# Patient Record
Sex: FEMALE | Race: WHITE | NOT HISPANIC OR LATINO | URBAN - METROPOLITAN AREA
[De-identification: names, ages, dates, MRNs, and addresses within clinical notes are randomized per-mention and may not be internally consistent; named-entity substitution may affect disease eponyms.]

---

## 2023-03-31 LAB
CHORIOGONADOTROPIN (MIU/ML) IN SER/PLAS: <2 MIU/ML
ESTRADIOL (PG/ML) IN SER/PLAS: 45 PG/ML
PROGESTERONE (NG/ML) IN SER/PLAS: 0.7 NG/ML

## 2023-04-05 LAB
ESTRADIOL (PG/ML) IN SER/PLAS: 167 PG/ML
LUTEINIZING HORMONE (IU/ML) IN SER/PLAS: 0.7 IU/L
PROGESTERONE (NG/ML) IN SER/PLAS: <0.3 NG/ML

## 2023-05-01 LAB
CHORIOGONADOTROPIN (MIU/ML) IN SER/PLAS: <3 IU/L
ESTRADIOL (PG/ML) IN SER/PLAS: 19 PG/ML
PROGESTERONE (NG/ML) IN SER/PLAS: <0.3 NG/ML

## 2023-05-05 LAB
ESTRADIOL (PG/ML) IN SER/PLAS: 187 PG/ML
LUTEINIZING HORMONE (IU/ML) IN SER/PLAS: 0.7 IU/L
PROGESTERONE (NG/ML) IN SER/PLAS: <0.3 NG/ML

## 2023-09-06 LAB — ESTRADIOL (PG/ML) IN SER/PLAS: 37 PG/ML

## 2023-09-13 LAB
ESTRADIOL (PG/ML) IN SER/PLAS: 202 PG/ML
LUTEINIZING HORMONE (IU/ML) IN SER/PLAS: 5.8 IU/L
PROGESTERONE (NG/ML) IN SER/PLAS: 0.4 NG/ML

## 2023-09-14 LAB
ESTRADIOL (PG/ML) IN SER/PLAS: 1940 PG/ML
LUTEINIZING HORMONE (IU/ML) IN SER/PLAS: 1.8 IU/L
PROGESTERONE (NG/ML) IN SER/PLAS: <0.3 NG/ML

## 2023-09-16 LAB — PROGESTERONE (NG/ML) IN SER/PLAS: 18.4 NG/ML

## 2023-09-22 LAB — ESTRADIOL (PG/ML) IN SER/PLAS: 1812 PG/ML

## 2023-09-28 LAB
CHORIOGONADOTROPIN (MIU/ML) IN SER/PLAS: 325 IU/L
ESTRADIOL (PG/ML) IN SER/PLAS: 1849 PG/ML
PROGESTERONE (NG/ML) IN SER/PLAS: 15 NG/ML

## 2023-10-01 ENCOUNTER — LAB REQUISITION (OUTPATIENT)
Dept: LAB | Facility: HOSPITAL | Age: 38
End: 2023-10-01
Payer: COMMERCIAL

## 2023-10-01 DIAGNOSIS — O09.811 SUPERVISION OF PREGNANCY RESULTING FROM ASSISTED REPRODUCTIVE TECHNOLOGY, FIRST TRIMESTER (HHS-HCC): ICD-10-CM

## 2023-10-01 LAB
B-HCG SERPL-ACNC: 837 MIU/ML
TSH SERPL-ACNC: 1.92 MIU/L (ref 0.44–3.98)

## 2023-10-01 PROCEDURE — 84443 ASSAY THYROID STIM HORMONE: CPT

## 2023-10-01 PROCEDURE — 84702 CHORIONIC GONADOTROPIN TEST: CPT

## 2023-10-16 ENCOUNTER — LAB REQUISITION (OUTPATIENT)
Dept: LAB | Facility: HOSPITAL | Age: 38
End: 2023-10-16
Payer: COMMERCIAL

## 2023-10-16 DIAGNOSIS — O09.811 SUPERVISION OF PREGNANCY RESULTING FROM ASSISTED REPRODUCTIVE TECHNOLOGY, FIRST TRIMESTER (HHS-HCC): ICD-10-CM

## 2023-10-16 LAB
B-HCG SERPL-ACNC: ABNORMAL MIU/ML
ESTRADIOL SERPL-MCNC: 2239 PG/ML
PROGEST SERPL-MCNC: 17.8 NG/ML

## 2023-10-16 PROCEDURE — 82670 ASSAY OF TOTAL ESTRADIOL: CPT

## 2023-10-16 PROCEDURE — 84144 ASSAY OF PROGESTERONE: CPT

## 2023-10-16 PROCEDURE — 84702 CHORIONIC GONADOTROPIN TEST: CPT

## 2023-10-30 ENCOUNTER — LAB REQUISITION (OUTPATIENT)
Dept: LAB | Facility: HOSPITAL | Age: 38
End: 2023-10-30
Payer: COMMERCIAL

## 2023-10-30 DIAGNOSIS — O09.811 SUPERVISION OF PREGNANCY RESULTING FROM ASSISTED REPRODUCTIVE TECHNOLOGY, FIRST TRIMESTER (HHS-HCC): ICD-10-CM

## 2023-10-30 LAB
ESTRADIOL SERPL-MCNC: 1997 PG/ML
HOLD SPECIMEN: NORMAL
PROGEST SERPL-MCNC: 30.6 NG/ML

## 2023-10-30 PROCEDURE — 84144 ASSAY OF PROGESTERONE: CPT

## 2023-10-30 PROCEDURE — 82670 ASSAY OF TOTAL ESTRADIOL: CPT

## 2023-11-06 ENCOUNTER — LAB REQUISITION (OUTPATIENT)
Dept: LAB | Facility: HOSPITAL | Age: 38
End: 2023-11-06
Payer: COMMERCIAL

## 2023-11-06 DIAGNOSIS — O09.811 SUPERVISION OF PREGNANCY RESULTING FROM ASSISTED REPRODUCTIVE TECHNOLOGY, FIRST TRIMESTER (HHS-HCC): ICD-10-CM

## 2023-11-06 LAB
B-HCG SERPL-ACNC: ABNORMAL MIU/ML
ESTRADIOL SERPL-MCNC: 1449 PG/ML
PROGEST SERPL-MCNC: 50.5 NG/ML

## 2023-11-06 PROCEDURE — 84702 CHORIONIC GONADOTROPIN TEST: CPT

## 2023-11-06 PROCEDURE — 84144 ASSAY OF PROGESTERONE: CPT

## 2023-11-06 PROCEDURE — 82670 ASSAY OF TOTAL ESTRADIOL: CPT

## 2024-12-02 DIAGNOSIS — F98.8 ATTENTION DEFICIT DISORDER, UNSPECIFIED TYPE: Primary | ICD-10-CM

## 2024-12-24 ENCOUNTER — APPOINTMENT (OUTPATIENT)
Dept: BEHAVIORAL HEALTH | Facility: CLINIC | Age: 39
End: 2024-12-24
Payer: COMMERCIAL

## 2024-12-24 DIAGNOSIS — F98.8 ATTENTION DEFICIT DISORDER, UNSPECIFIED TYPE: ICD-10-CM

## 2024-12-24 PROCEDURE — 90792 PSYCH DIAG EVAL W/MED SRVCS: CPT | Performed by: STUDENT IN AN ORGANIZED HEALTH CARE EDUCATION/TRAINING PROGRAM

## 2024-12-24 NOTE — PROGRESS NOTES
"Outpatient Psychiatry    Subjective   Mary Lantigua, a 39 y.o. female, presenting to Psychiatry for evaluation.  Patient is referred by PHILL Eagle-CNP .      Virtual or Telephone Consent    An interactive audio and video telecommunication system which permits real time communications between the patient (at the originating site) and provider (at the distant site) was utilized to provide this telehealth service.   Verbal consent was requested and obtained from Mary Lantigua on this date, 25 for a telehealth visit.      HPI:    Patient reports that recently has noted she has been having difficult concentrating.  Is 6 months post partum. Looking back she feels like she has had these sx beofre, but now that she has a , it has become much harder for her to navigate daily life.   Was driving to [place] and forgot where she was going twice  Went to college at 31, was having difficulties paying attention in class, her teachers would tell her she is not paying attention to details, though she felt she was trying her best.   Similar sx in high school, but never addressed it.  Denies any depression sx in the past or present  Anxiety-largely centered around current inattention and increasing difficulties with daily life.   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   Is additionally having difficulties with charting after seeing patient.   Sleep- on and  off based on daughters sleep  Appetite- \"non existent\" since post pregnancy  No feeling overwhelmed with new born, reports she has good support system to help out with her  Acute stressors- feels like she \"looks stupid\" with her ongoing sx of inattention, forgetfulness. Feels sx escalated in February. Denies any current anxiety or depressive sx. Denies any past or present SI/HI/AVH.       Psychiatric Review Of Systems:  Depressive Symptoms: energy  Manic Symptoms: negative  Anxiety " Symptoms: General Anxiety Disorder (KIMBERELE)KIMBERLEE Behaviors: difficult to control worry, excessive anxiety/worry, difficulty concentrating, irritability, and restlessness  Psychotic Symptoms: negative  Other Symptoms:    Current Medications:    Current Outpatient Medications:     ascorbic acid (Vitamin C) 500 mg tablet, Take 1 tablet (500 mg) by mouth once daily., Disp: , Rfl:     cholecalciferol (Vitamin D3) 25 MCG (1000 UT) tablet, Take 1 tablet (1,000 Units) by mouth once daily., Disp: , Rfl:     ferrous sulfate 325 (65 Fe) MG EC tablet, Take 1 tablet by mouth once daily with breakfast. Do not crush, chew, or split., Disp: , Rfl:     Medical History:  No past medical history on file.    Past Psychiatric History:   Diagnoses: none  Previous Psychiatrist: none  Therapy: guidance counsellor when sister passed  Current psychiatric medications: none  Past psychiatric medications: none  Past psychiatric treatments: none  Hospitalizations:none  Suicide attempts:  none  Family psychiatric history: none    Social History:   Currently lives: , daughter  Education: college  Work/Finances: works as a Nutritionist at the [employer]  Marital history/children:   Current stressors: current inattention, and inability to complete tasks  Social support:    Legal History: at 20 for underage drinking   History: denies  History of violence: denies  Access to Weapons: yes,  is a       Substance Use History:  Tobacco use: none  Use of alcohol: minimal use  Use of caffeine: coffee 1 cup /day  Use of other substances: none  Legal consequences of substance use: denies  Substance use disorder treatment: none    Record Review: moderate     Medical Review Of Systems:  Pertinent items are noted in HPI.    OARRS:  Jeannette Lopez MD on 1/21/2025  3:29 PM  I have personally reviewed the OARRS report for Mary Rubalcavamarie. I have considered the risks of abuse, dependence, addiction and diversion        Objective  "  Mental Status Exam  Appearance: well groomed, appears stated age  Attitude: Calm, cooperative, and engaged in conversation.  Behavior: Appropriate eye contact.   Motor Activity: No psychomotor agitation or retardation. No abnormal movements, tremors or tics. No evidence of extrapyramidal symptoms or tardive dyskinesia.  Speech: Regular rate, rhythm, volume. Spontaneous, no pressured speech.  Mood: \"good\"  Affect: Euthymic, full range, mood congruent.  Thought Process: Linear, logical, and goal-directed. No loose associations or gross thought disorganization.  Thought Content: Denied current suicidal ideation or thoughts of harm to self, denied homicidal ideation or thoughts of harm to others. No delusional thinking elicited. No perseverations or obsessions identified.   Perception: Did not endorse auditory or visual hallucinations, did not appear to be responding to hallucinatory stimuli.   Cognition: Alert, oriented x3. Preserved attention span and concentration, recent and remote memory. Adequate fund of knowledge. No deficits in language.   Insight: Fair, in regards to understanding mental health condition  Judgement: Fair      Vitals:  There were no vitals filed for this visit.    Other Objective Information:  No visits with results within 6 Month(s) from this visit.   Latest known visit with results is:   Lab Requisition on 11/06/2023   Component Date Value Ref Range Status    Estradiol 11/06/2023 1,449  pg/mL Final    Progesterone 11/06/2023 50.5  ng/mL Final    Ref Values  Male              <0.3- 1.2    Follicular Phase  <0.3- 1.4       Luteal Phase       3.3-25.6     Mid-Luteal Phase   4.4-28.0  Postmenopausal    <0.3- 0.7  Pregnant Females:     1st Trimester     11.2- 90.0         2nd Trimester     25.6- 89.4     3RD Trimester     48.4-422.5     Patients receiving DHEA-S supplements may show false elevation of progesterone for results near 1.0 ng/mL. Contact laboratory at [phone] if alternative " testing is needed.      HCG, Beta-Quantitative 11/06/2023 229,613 (H)  <5 mIU/mL Final    Low-level positive HCG results can be seen in early pregnancy, in jesus- or post-menopausal females due to normal pituitary HCG production, or with analytic interference. Repeat testing in 48-72 hours can aid in assessing for pregnancy as results should double in this time period. FSH measurement is recommended in jesus- or post-menopausal females as concurrent elevation of FSH can support pituitary production as the source of the HCG elevation.          Risk Assessment:  Risk of harm to self: Low Risk -- Risk factors include: No significant risk factors identified on screening Protective factors include:Denies current suicidal ideation, Denies history of suicide attempts , Future-oriented talk , and Willingness to seek help and support     Risk of harm to others: Low Risk - Risk factors include: No significant risk factors identified on screening. Protective factors include: Lack of known history of harm to others , Lack of known history of violent ideation , and Lack of known access to firearms       Diagnoses and all orders for this visit:  Attention deficit disorder, unspecified type  -     [facility]erral to [facility]  -     Drug Screen, Urine With Reflex to Confirmation; Future       Plan/Recommendations:  Medications: none current  Orders: Urine drug screen  BAARS IV to be evaluated at next appt  Follow up: 4 weeks  Call  Psychiatry at [phone] with issues.  For [facility] residents, "[employer] is a 24/7 hotline you can call for assistance [157.251.6831]. Please call 121/852 or go to your closest Emergency Room if you feel unsafe. This includes thoughts of hurting yourself or anyone else, or having other troubles such as hearing voices, seeing visions, or having new and scary thoughts about the people around you.      Jeannette Lopez MD

## 2024-12-30 ENCOUNTER — APPOINTMENT (OUTPATIENT)
Dept: PRIMARY CARE | Facility: CLINIC | Age: 39
End: 2024-12-30
Payer: COMMERCIAL

## 2024-12-30 VITALS
SYSTOLIC BLOOD PRESSURE: 110 MMHG | OXYGEN SATURATION: 99 % | HEIGHT: 69 IN | BODY MASS INDEX: 19.85 KG/M2 | TEMPERATURE: 96.9 F | HEART RATE: 85 BPM | WEIGHT: 134 LBS | DIASTOLIC BLOOD PRESSURE: 64 MMHG

## 2024-12-30 DIAGNOSIS — F98.8 ATTENTION DEFICIT DISORDER, UNSPECIFIED TYPE: ICD-10-CM

## 2024-12-30 DIAGNOSIS — Z00.00 ANNUAL PHYSICAL EXAM: Primary | ICD-10-CM

## 2024-12-30 LAB
25(OH)D3 SERPL-MCNC: 59 NG/ML (ref 30–100)
ALBUMIN SERPL BCP-MCNC: 4.6 G/DL (ref 3.4–5)
ALP SERPL-CCNC: 65 U/L (ref 33–110)
ALT SERPL W P-5'-P-CCNC: 10 U/L (ref 7–45)
AMPHETAMINES UR QL SCN: ABNORMAL
ANION GAP SERPL CALC-SCNC: 13 MMOL/L (ref 10–20)
AST SERPL W P-5'-P-CCNC: 12 U/L (ref 9–39)
BARBITURATES UR QL SCN: ABNORMAL
BENZODIAZ UR QL SCN: ABNORMAL
BILIRUB SERPL-MCNC: 1 MG/DL (ref 0–1.2)
BUN SERPL-MCNC: 10 MG/DL (ref 6–23)
BZE UR QL SCN: ABNORMAL
CALCIUM SERPL-MCNC: 9 MG/DL (ref 8.6–10.6)
CANNABINOIDS UR QL SCN: ABNORMAL
CHLORIDE SERPL-SCNC: 103 MMOL/L (ref 98–107)
CHOLEST SERPL-MCNC: 181 MG/DL (ref 0–199)
CHOLESTEROL/HDL RATIO: 2.8
CO2 SERPL-SCNC: 28 MMOL/L (ref 21–32)
CREAT SERPL-MCNC: 0.74 MG/DL (ref 0.5–1.05)
EGFRCR SERPLBLD CKD-EPI 2021: >90 ML/MIN/1.73M*2
ERYTHROCYTE [DISTWIDTH] IN BLOOD BY AUTOMATED COUNT: 11.9 % (ref 11.5–14.5)
EST. AVERAGE GLUCOSE BLD GHB EST-MCNC: 97 MG/DL
FENTANYL+NORFENTANYL UR QL SCN: ABNORMAL
GLUCOSE SERPL-MCNC: 102 MG/DL (ref 74–99)
HBA1C MFR BLD: 5 %
HCT VFR BLD AUTO: 42.2 % (ref 36–46)
HDLC SERPL-MCNC: 65.1 MG/DL
HGB BLD-MCNC: 14 G/DL (ref 12–16)
IRON SATN MFR SERPL: 28 % (ref 25–45)
IRON SERPL-MCNC: 90 UG/DL (ref 35–150)
LDLC SERPL CALC-MCNC: 102 MG/DL
MCH RBC QN AUTO: 31.3 PG (ref 26–34)
MCHC RBC AUTO-ENTMCNC: 33.2 G/DL (ref 32–36)
MCV RBC AUTO: 94 FL (ref 80–100)
METHADONE UR QL SCN: ABNORMAL
NON HDL CHOLESTEROL: 116 MG/DL (ref 0–149)
NRBC BLD-RTO: 0 /100 WBCS (ref 0–0)
OPIATES UR QL SCN: ABNORMAL
OXYCODONE+OXYMORPHONE UR QL SCN: ABNORMAL
PCP UR QL SCN: ABNORMAL
PLATELET # BLD AUTO: 258 X10*3/UL (ref 150–450)
POTASSIUM SERPL-SCNC: 4.2 MMOL/L (ref 3.5–5.3)
PROT SERPL-MCNC: 7 G/DL (ref 6.4–8.2)
RBC # BLD AUTO: 4.48 X10*6/UL (ref 4–5.2)
SODIUM SERPL-SCNC: 140 MMOL/L (ref 136–145)
TIBC SERPL-MCNC: 327 UG/DL (ref 240–445)
TRIGL SERPL-MCNC: 69 MG/DL (ref 0–149)
TSH SERPL-ACNC: 0.8 MIU/L (ref 0.44–3.98)
UIBC SERPL-MCNC: 237 UG/DL (ref 110–370)
VIT B12 SERPL-MCNC: 808 PG/ML (ref 211–911)
VLDL: 14 MG/DL (ref 0–40)
WBC # BLD AUTO: 5.3 X10*3/UL (ref 4.4–11.3)

## 2024-12-30 PROCEDURE — 83540 ASSAY OF IRON: CPT

## 2024-12-30 PROCEDURE — 80061 LIPID PANEL: CPT

## 2024-12-30 PROCEDURE — 1036F TOBACCO NON-USER: CPT | Performed by: NURSE PRACTITIONER

## 2024-12-30 PROCEDURE — 85027 COMPLETE CBC AUTOMATED: CPT

## 2024-12-30 PROCEDURE — 80053 COMPREHEN METABOLIC PANEL: CPT

## 2024-12-30 PROCEDURE — 82607 VITAMIN B-12: CPT

## 2024-12-30 PROCEDURE — 84443 ASSAY THYROID STIM HORMONE: CPT

## 2024-12-30 PROCEDURE — 82306 VITAMIN D 25 HYDROXY: CPT

## 2024-12-30 PROCEDURE — 80307 DRUG TEST PRSMV CHEM ANLYZR: CPT

## 2024-12-30 PROCEDURE — 80354 DRUG SCREENING FENTANYL: CPT

## 2024-12-30 PROCEDURE — 83550 IRON BINDING TEST: CPT

## 2024-12-30 PROCEDURE — 83036 HEMOGLOBIN GLYCOSYLATED A1C: CPT

## 2024-12-30 PROCEDURE — 99395 PREV VISIT EST AGE 18-39: CPT | Performed by: NURSE PRACTITIONER

## 2024-12-30 PROCEDURE — 3008F BODY MASS INDEX DOCD: CPT | Performed by: NURSE PRACTITIONER

## 2024-12-30 RX ORDER — CHOLECALCIFEROL (VITAMIN D3) 25 MCG
1000 TABLET ORAL DAILY
COMMUNITY

## 2024-12-30 RX ORDER — ASCORBIC ACID 500 MG
500 TABLET ORAL DAILY
COMMUNITY

## 2024-12-30 RX ORDER — FERROUS SULFATE 325(65) MG
325 TABLET, DELAYED RELEASE (ENTERIC COATED) ORAL
COMMUNITY

## 2024-12-30 ASSESSMENT — PROMIS GLOBAL HEALTH SCALE
RATE_SOCIAL_SATISFACTION: VERY GOOD
RATE_PHYSICAL_HEALTH: EXCELLENT
RATE_AVERAGE_FATIGUE: MILD
EMOTIONAL_PROBLEMS: RARELY
CARRYOUT_SOCIAL_ACTIVITIES: VERY GOOD
RATE_GENERAL_HEALTH: VERY GOOD
CARRYOUT_PHYSICAL_ACTIVITIES: COMPLETELY
RATE_QUALITY_OF_LIFE: EXCELLENT
RATE_MENTAL_HEALTH: FAIR
RATE_AVERAGE_PAIN: 0

## 2024-12-30 ASSESSMENT — PAIN SCALES - GENERAL: PAINLEVEL_OUTOF10: 0-NO PAIN

## 2024-12-30 ASSESSMENT — ENCOUNTER SYMPTOMS
OCCASIONAL FEELINGS OF UNSTEADINESS: 0
LOSS OF SENSATION IN FEET: 0
DEPRESSION: 0

## 2024-12-30 ASSESSMENT — PATIENT HEALTH QUESTIONNAIRE - PHQ9
SUM OF ALL RESPONSES TO PHQ9 QUESTIONS 1 AND 2: 0
1. LITTLE INTEREST OR PLEASURE IN DOING THINGS: NOT AT ALL
2. FEELING DOWN, DEPRESSED OR HOPELESS: NOT AT ALL

## 2024-12-30 NOTE — PATIENT INSTRUCTIONS
Labs will be released to My Chart or if you are not connected you will be notified of abnormals only    Follow up after starting a new medication with Psychiatry    Any otc cold and sinus medication for sinus symptoms    Get more information from your family about FMB    Congratulations! Your doing a great job! Now is the time to find ways to make things easier  Look into organizational tips, prepping in advance, cooking meals, filling diaper bags, etc especially for the coming day is very beneficial. Pinterest is a good source.     Health Tips      Develop good health habits now  Don't put it off. With good health habits, you can prevent or reduce the likelihood of health-impacting conditions later in life, such as obesity, high blood pressure, heart disease, or diabetes. Establishing good health habits now is easier than having to begin these practices later on, or to have to break bad habits.      Establish a relationship with a primary care provider   A PCP can help you on your health journey. When you see a provider on a regular basis, you're more likely to feel comfortable about talking about sensitive issues as well as being receptive to the advice your provider offers, because you develop a trusting relationship. And by getting to know you over time, your doctor may be better able to  on signs of health concerns.      Know your family health history   Knowing your family's health history can help you and your primary care provider better manage your health - and be aware of potential hereditary risks to be watching for.  Things like migraines or even family members with certain cancers and heart attack can increase your risk.       Get regular health screenings and Keep up with immunizations. This includes the HPV vaccine, for men and women.   For women: Monthly Self breast exams, See Gynecology for a Pap smear; HPV screening for the virus that causes genital warts, which can lead to cervical cancer    For men: Monthly Self testicular exams.   For both: sexually transmitted disease testing, if sexually active. Remember to use birth control to prevent and to protect. Talk with your health care provider about the screening schedule that's best for you.    Have good for you people in your life  Its all about a few good friends over a lot of not so good friends. If you are close to your family stay that way, if not then develop new relationships that help you to grow and thrive. Often people make friends at work, [employer], community groups  Developing and maintaining a work-life balance that allows room for friendships and relationships can have a positive impact on your mental and emotional health.     Be a numbers person  Keep tabs on numbers that affect your health, like weight, blood pressure, the amount of calories you consume, and cholesterol. Your health care provider can help you with this.      Pay attention to the risk of a few extra pounds.  If you gain four or five pounds every year, it doesn't seem like a lot, but at the end of 10 years, you've added 40 or 50 pounds - and in 15 to 20 years, you have 75 to 100 extra pounds that you're carrying!  *Choose a life of healthful eating over trendy diets. The more effective approach: adopt a life-time practice of eating a balanced, nutritional diet that includes vegetables, fruits, lean meats, whole grains, low-fat dairy, nuts and legumes, and non-tropical vegetable oils. And limit sweets.   *Practice portion control. There's more to healthy eating than choosing nutritious food. There's also limiting how much you eat, even if you're eating incredibly healthy food *Remember, your metabolic rate slows as you age. That is, your body becomes less efficient in burning calories.     Stay active   If you're exercising on a regular basis, that is going to help with a lot of health problems that are related to lifestyle.  You don't have to be an athlete, start with a  walking program. Even 10 minutes of good exercise is beneficial. Don't forget weight training. Any Weights 3 days a week maintains bone health and helps to burn calories    Get enough sleep  For most that means seven to nine hours a night.   Sleep affects your ability to learn new information and to memorize and process information. Reaction time is adversely affected by too little sleep (a big safety risk similar to drinking alcohol). In the long run, sleep makes a big difference in how you function and succeed     Mood impacts your overall health  People who are struggling with depression, anxiety, and self-esteem issues really have a lot of difficulty with their health. When depressed, you may not be motivated and may not see the value of taking care of your health. Self Care, Exercise and friendships can help reduce your risk of mental and emotional health issues, and when you need it, your health care provider can help you get professional help.      Don't vape  Vaping is a big problem ,it's not just flavored water, nicotine comes from tobacco so you are in essence still smoking. Also, we don't know about the effects of what's in vaping cartridges, and sometimes they're modified with substances that can cause lung failure. Cigarettes are even worse with known cancer causing chemicals  2 ml of vape juice is equal to 1 pack of cigarettes    Think twice about marijuana  Even in states where marijuana is legal (medically or recreationally) it doesn't mean your employer is going to think it's OK.   Like alcohol, marijuana affects your reasoning, decision-making, and ability to operate equipment safely

## 2024-12-30 NOTE — PROGRESS NOTES
"Subjective   Patient ID: Mary Lantigua is a 39 y.o. female who presents for Annual Exam.    HPI  Pleasant established 40 y/o female with PMH negative presents for Annual exam    Current concerns  Here to start taking care of herself  Possible ADD- Since she was 13, trouble concentrating, started when her sister passed away but did not reach out to have it addressed, struggled in school and thought she was stupid. 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 Getting overwhelmed since having a baby girl 7 months ago and no longer feels she can manage. Psychiatry evaluation ongoing, she will submit drug screen today and follow up with me to manage medications if started.  Denies depression, anxiety with situation, sleeps well    Reports mom recently told her that multiple cousins, Aunts/Uncles with FMB? several have had strokes, aneurysm, she hasn't looked into it, possibly mom meant FMD? Fibromuscular Dysplasia. Mom has not been tested. Advised to get more information      Works as Dietician at the [employer]   1 daughter 7 months old     Diet healthy, fruits and vegetables  Caffeine 1  Water 50 oz  Exercise recently started walking again    Non-smoker  Alcohol Social       Eye exam Today  Dental exam 2024  Gyn no abnormal pap 2024    Family history Dad CABG x 3 age 50  Mom prediabetes  HLD, HTN family members     Review of Systems  Review of Systems   Constitutional: Negative.    HENT: Negative.     Respiratory: Negative.     Cardiovascular: Negative.    Gastrointestinal: Negative.    Genitourinary: Negative.    Musculoskeletal: Negative.    Psychiatric/Behavioral: HPI     All other systems reviewed and are negative.    .vsVisit Vitals  /64 (BP Location: Left arm, Patient Position: Sitting)   Pulse 85   Temp 36.1 °C (96.9 °F)   Ht 1.753 m (5' 9\")   Wt 60.8 kg (134 lb)   LMP 12/29/2024   SpO2 99%   BMI 19.79 kg/m²   OB Status Having periods   Smoking Status " Never   BSA 1.72 m²         Objective   Physical Exam  Vitals reviewed.   Constitutional:       Appearance: Normal appearance. She is normal weight.   HENT:      Head: Normocephalic and atraumatic.      Right Ear: Tympanic membrane and ear canal normal.      Left Ear: Tympanic membrane and ear canal normal.      Nose: Rhinorrhea present.      Mouth/Throat:      Pharynx: Posterior oropharyngeal erythema present.   Eyes:      Extraocular Movements: Extraocular movements intact.      Conjunctiva/sclera: Conjunctivae normal.      Pupils: Pupils are equal, round, and reactive to light.   Cardiovascular:      Rate and Rhythm: Normal rate and regular rhythm.      Pulses: Normal pulses.      Heart sounds: Normal heart sounds.   Pulmonary:      Effort: Pulmonary effort is normal.      Breath sounds: Normal breath sounds. No wheezing.   Abdominal:      General: Bowel sounds are normal. There is no distension.      Palpations: Abdomen is soft.      Tenderness: There is no abdominal tenderness.   Musculoskeletal:         General: Normal range of motion.      Cervical back: Normal range of motion and neck supple.   Skin:     General: Skin is warm.      Capillary Refill: Capillary refill takes 2 to 3 seconds.   Neurological:      General: No focal deficit present.      Mental Status: She is alert.   Psychiatric:         Mood and Affect: Mood normal.         Thought Content: Thought content normal.         Assessment/Plan   Problem List Items Addressed This Visit       Annual physical exam - Primary    Relevant Orders    CBC    Comprehensive Metabolic Panel    Vitamin D 25-Hydroxy,Total (for eval of Vitamin D levels)    TSH with reflex to Free T4 if abnormal    Hemoglobin A1C    Lipid Panel    Iron and TIBC    Vitamin B12    Attention deficit disorder    Relevant Orders    CBC    Comprehensive Metabolic Panel    TSH with reflex to Free T4 if abnormal

## 2025-01-03 LAB
FENTANYL UR CFM-MCNC: <2.5 NG/ML
NORFENTANYL UR CFM-MCNC: <2.5 NG/ML

## 2025-01-21 ENCOUNTER — OFFICE VISIT (OUTPATIENT)
Dept: BEHAVIORAL HEALTH | Facility: CLINIC | Age: 40
End: 2025-01-21
Payer: COMMERCIAL

## 2025-01-21 DIAGNOSIS — F90.0 ATTENTION DEFICIT HYPERACTIVITY DISORDER (ADHD), PREDOMINANTLY INATTENTIVE TYPE: Primary | ICD-10-CM

## 2025-01-21 PROCEDURE — 99214 OFFICE O/P EST MOD 30 MIN: CPT | Performed by: STUDENT IN AN ORGANIZED HEALTH CARE EDUCATION/TRAINING PROGRAM

## 2025-01-21 RX ORDER — LISDEXAMFETAMINE DIMESYLATE 20 MG/1
20 CAPSULE ORAL EVERY MORNING
Qty: 10 CAPSULE | Refills: 0 | Status: SHIPPED | OUTPATIENT
Start: 2025-01-21 | End: 2025-01-31

## 2025-01-21 NOTE — PROGRESS NOTES
Outpatient Psychiatry    Subjective   Mary Lantigua, a 39 y.o. female, presenting to Psychiatry for evaluation.  Patient is referred by PHILL Eagle-CNP .      Virtual or Telephone Consent    An interactive audio and video telecommunication system which permits real time communications between the patient (at the originating site) and provider (at the distant site) was utilized to provide this telehealth service.   Verbal consent was requested and obtained from Mary Lantigua on this date, 01/21/25 for a telehealth visit.      Interim Progress Note:    Had a nice weekend getaway with  over the weekend. Baby doing well. Denies any other concerns today.  No changes in sx of inattention, forgetfulness, have continued to persist. Labwork from PCP came back with no concerns. UDS negative for all substances. BAARS IV significant for ADHD, inattentive type. Discussed medication options, patient open to trialling Vyvanse for her symptom management.       Psychiatric Review Of Systems:  Depressive Symptoms: energy  Manic Symptoms: negative  Anxiety Symptoms: General Anxiety Disorder (KIMBERLEE)KIMBERLEE Behaviors: difficult to control worry, excessive anxiety/worry, difficulty concentrating, irritability, and restlessness  Psychotic Symptoms: negative  Other Symptoms:    Current Medications:    Current Outpatient Medications:     ascorbic acid (Vitamin C) 500 mg tablet, Take 1 tablet (500 mg) by mouth once daily., Disp: , Rfl:     cholecalciferol (Vitamin D3) 25 MCG (1000 UT) tablet, Take 1 tablet (1,000 Units) by mouth once daily., Disp: , Rfl:     ferrous sulfate 325 (65 Fe) MG EC tablet, Take 1 tablet by mouth once daily with breakfast. Do not crush, chew, or split., Disp: , Rfl:     Medical History:  No past medical history on file.    Past Psychiatric History:   Diagnoses: none  Previous Psychiatrist: none  Therapy: guidance counsellor when sister passed  Current psychiatric medications: none  Past  "psychiatric medications: none  Past psychiatric treatments: none  Hospitalizations:none  Suicide attempts:  none  Family psychiatric history: none    Social History:   Currently lives: , daughter  Education: college  Work/Finances: works as a Nutritionist at the [employer]  Marital history/children:   Current stressors: current inattention, and inability to complete tasks  Social support:    Legal History: at 20 for underage drinking   History: denies  History of violence: denies  Access to Weapons: yes,  is a       Substance Use History:  Tobacco use: none  Use of alcohol: minimal use  Use of caffeine: coffee 1 cup /day  Use of other substances: none  Legal consequences of substance use: denies  Substance use disorder treatment: none    Record Review: moderate     Medical Review Of Systems:  Pertinent items are noted in HPI.    OARRS:  Jeannette Lopez MD on 1/21/2025  3:36 PM  I have personally reviewed the OARRS report for Mary Lantigua. I have considered the risks of abuse, dependence, addiction and diversion        Objective   Mental Status Exam  Appearance: well groomed, appears stated age  Attitude: Calm, cooperative, and engaged in conversation.  Behavior: Appropriate eye contact.   Motor Activity: No psychomotor agitation or retardation. No abnormal movements, tremors or tics. No evidence of extrapyramidal symptoms or tardive dyskinesia.  Speech: Regular rate, rhythm, volume. Spontaneous, no pressured speech.  Mood: \"good\"  Affect: Euthymic, full range, mood congruent.  Thought Process: Linear, logical, and goal-directed. No loose associations or gross thought disorganization.  Thought Content: Denied current suicidal ideation or thoughts of harm to self, denied homicidal ideation or thoughts of harm to others. No delusional thinking elicited. No perseverations or obsessions identified.   Perception: Did not endorse auditory or visual hallucinations, did not appear " to be responding to hallucinatory stimuli.   Cognition: Alert, oriented x3. Preserved attention span and concentration, recent and remote memory. Adequate fund of knowledge. No deficits in language.   Insight: Fair, in regards to understanding mental health condition  Judgement: Fair      Vitals:  There were no vitals filed for this visit.    Other Objective Information:  Office Visit on 12/30/2024   Component Date Value Ref Range Status    Vitamin B12 12/30/2024 808  211 - 911 pg/mL Final    Iron 12/30/2024 90  35 - 150 ug/dL Final    UIBC 12/30/2024 237  110 - 370 ug/dL Final    TIBC 12/30/2024 327  240 - 445 ug/dL Final    % Saturation 12/30/2024 28  25 - 45 % Final    Cholesterol 12/30/2024 181  0 - 199 mg/dL Final          Age      Desirable   Borderline High   High     0-19 Y     0 - 169       170 - 199     >/= 200    20-24 Y     0 - 189       190 - 224     >/= 225         >24 Y     0 - 199       200 - 239     >/= 240   **All ranges are based on fasting samples. Specific   therapeutic targets will vary based on patient-specific   cardiac risk.    Pediatric guidelines reference:Pediatrics 2011, 128(S5).Adult guidelines reference: NCEP ATPIII Guidelines,RONAK 2001, 258:2486-97    Venipuncture immediately after or during the administration of Metamizole may lead to falsely low results. Testing should be performed immediately prior to Metamizole dosing.    HDL-Cholesterol 12/30/2024 65.1  mg/dL Final      Age       Very Low   Low     Normal    High    0-19 Y    < 35      < 40     40-45     ----  20-24 Y    ----     < 40      >45      ----        >24 Y      ----     < 40     40-60      >60      Cholesterol/HDL Ratio 12/30/2024 2.8   Final      Ref Values  Desirable  < 3.4  High Risk  > 5.0    LDL Calculated 12/30/2024 102 (H)  <=99 mg/dL Final                                Near   Borderline      AGE      Desirable  Optimal    High     High     Very High     0-19 Y     0 - 109     ---    110-129   >/= 130     ----     20-24 Y     0 - 119     ---    120-159   >/= 160     ----      >24 Y     0 -  99   100-129  130-159   160-189     >/=190      VLDL 12/30/2024 14  0 - 40 mg/dL Final    Triglycerides 12/30/2024 69  0 - 149 mg/dL Final    Age              Desirable        Borderline         High        Very High  SEX:B           mg/dL             mg/dL               mg/dL      mg/dL  <=14D                       ----               ----        ----  15D-365D                    ----               ----        ----  1Y-9Y           0-74               75-99             >=100       ----  10Y-19Y        0-89                            >=130       ----  20Y-24Y        0-114             115-149             >=150      ----  >= 25Y         0-149             150-199             200-499    >=500      Venipuncture immediately after or during the administration of Metamizole may lead to falsely low results. Testing should be performed immediately prior to Metamizole dosing.    Non HDL Cholesterol 12/30/2024 116  0 - 149 mg/dL Final          Age       Desirable   Borderline High   High     Very High     0-19 Y     0 - 119       120 - 144     >/= 145    >/= 160    20-24 Y     0 - 149       150 - 189     >/= 190      ----         >24 Y    30 mg/dL above LDL Cholesterol goal      Hemoglobin A1C 12/30/2024 5.0  See comment % Final    Estimated Average Glucose 12/30/2024 97  Not Established mg/dL Final    Thyroid Stimulating Hormone 12/30/2024 0.80  0.44 - 3.98 mIU/L Final    Vitamin D, 25-Hydroxy, Total 12/30/2024 59  30 - 100 ng/mL Final    Glucose 12/30/2024 102 (H)  74 - 99 mg/dL Final    Sodium 12/30/2024 140  136 - 145 mmol/L Final    Potassium 12/30/2024 4.2  3.5 - 5.3 mmol/L Final    Chloride 12/30/2024 103  98 - 107 mmol/L Final    Bicarbonate 12/30/2024 28  21 - 32 mmol/L Final    Anion Gap 12/30/2024 13  10 - 20 mmol/L Final    Urea Nitrogen 12/30/2024 10  6 - 23 mg/dL Final    Creatinine 12/30/2024 0.74  0.50 - 1.05 mg/dL  Final    eGFR 12/30/2024 >90  >60 mL/min/1.73m*2 Final    Calculations of estimated GFR are performed using the 2021 CKD-EPI Study Refit equation without the race variable for the IDMS-Traceable creatinine methods.  https://jasn.asnjournals.org/content/early/2021/09/22/ASN.5952819540    Calcium 12/30/2024 9.0  8.6 - 10.6 mg/dL Final    Albumin 12/30/2024 4.6  3.4 - 5.0 g/dL Final    Alkaline Phosphatase 12/30/2024 65  33 - 110 U/L Final    Total Protein 12/30/2024 7.0  6.4 - 8.2 g/dL Final    AST 12/30/2024 12  9 - 39 U/L Final    Bilirubin, Total 12/30/2024 1.0  0.0 - 1.2 mg/dL Final    ALT 12/30/2024 10  7 - 45 U/L Final    Patients treated with Sulfasalazine may generate falsely decreased results for ALT.    WBC 12/30/2024 5.3  4.4 - 11.3 x10*3/uL Final    nRBC 12/30/2024 0.0  0.0 - 0.0 /100 WBCs Final    RBC 12/30/2024 4.48  4.00 - 5.20 x10*6/uL Final    Hemoglobin 12/30/2024 14.0  12.0 - 16.0 g/dL Final    Hematocrit 12/30/2024 42.2  36.0 - 46.0 % Final    MCV 12/30/2024 94  80 - 100 fL Final    MCH 12/30/2024 31.3  26.0 - 34.0 pg Final    MCHC 12/30/2024 33.2  32.0 - 36.0 g/dL Final    RDW 12/30/2024 11.9  11.5 - 14.5 % Final    Platelets 12/30/2024 258  150 - 450 x10*3/uL Final    Amphetamine Screen, Urine 12/30/2024 Presumptive Negative  Presumptive Negative Final    CUTOFF LEVEL: 500 NG/ML   Cross-reactivity has been reported with high concentrations   of the following drugs: buproprion, chloroquine, chlorpromazine,   ephedrine, mephentermine, fenfluramine, phentermine,   phenylpropanolamine, pseudoephedrine, and propranolol.    Barbiturate Screen, Urine 12/30/2024 Presumptive Negative  Presumptive Negative Final    CUTOFF LEVEL: 200 NG/ML    Benzodiazepines Screen, Urine 12/30/2024 Presumptive Negative  Presumptive Negative Final    CUTOFF LEVEL: 200 NG/ML    Cannabinoid Screen, Urine 12/30/2024 Presumptive Negative  Presumptive Negative Final    CUTOFF LEVEL: 50 NG/ML    Cocaine Metabolite Screen, Urine  12/30/2024 Presumptive Negative  Presumptive Negative Final    CUTOFF LEVEL: 150 NG/ML    Fentanyl Screen, Urine 12/30/2024 Presumptive Positive (A)  Presumptive Negative Final    CUTOFF LEVEL: 5 NG/ML    Opiate Screen, Urine 12/30/2024 Presumptive Negative  Presumptive Negative Final    CUTOFF LEVEL: 300 NG/ML  The opiate screen does not detect fentanyl, meperidine, or   tramadol. Oxycodone is not consistently detected (refer to  Oxycodone Screen, Urine result).    Oxycodone Screen, Urine 12/30/2024 Presumptive Negative  Presumptive Negative Final    CUTOFF LEVEL: 100 NG/ML  This test will accurately detect both oxycodone and oxymorphone.    PCP Screen, Urine 12/30/2024 Presumptive Negative  Presumptive Negative Final    CUTOFF LEVEL:  25 NG/ML  Cross-reactivity has been reported with dextromethorphan.    Methadone Screen, Urine 12/30/2024 Presumptive Negative  Presumptive Negative Final    CUTOFF LEVEL: 150 NG/ML  The metabolite L-alpha-acetylmethadol (LAAM) is not  detected by this method in concentrations that would  be found in the urine of patients on LAAM therapy.    Fentanyl 12/30/2024 <2.5  <2.5 ng/mL Final    Norfentanyl 12/30/2024 <2.5  <2.5 ng/mL Final       Risk Assessment:  Risk of harm to self: Low Risk -- Risk factors include: No significant risk factors identified on screening Protective factors include:Denies current suicidal ideation, Denies history of suicide attempts , Future-oriented talk , and Willingness to seek help and support     Risk of harm to others: Low Risk - Risk factors include: No significant risk factors identified on screening. Protective factors include: Lack of known history of harm to others , Lack of known history of violent ideation , and Lack of known access to firearms       Diagnoses and all orders for this visit:  Attention deficit hyperactivity disorder (ADHD), predominantly inattentive type (Primary)         Plan/Recommendations:  Medications: START Vyvanse 20mg PO  daily for ADHD sx  Orders: none  BAARS IV significant for ADHD, inattentive type  Follow up:   Call  Psychiatry at [phone] with issues.  For [facility] residents, [facility] is a 24/7 hotline you can call for assistance [[phone]]. Please call [phone] or go to your closest Emergency Room if you feel unsafe. This includes thoughts of hurting yourself or anyone else, or having other troubles such as hearing voices, seeing visions, or having new and scary thoughts about the people around you.      Jeannette Lopez MD

## 2025-01-30 ENCOUNTER — APPOINTMENT (OUTPATIENT)
Dept: PRIMARY CARE | Facility: CLINIC | Age: 40
End: 2025-01-30
Payer: COMMERCIAL

## 2025-02-06 ENCOUNTER — APPOINTMENT (OUTPATIENT)
Dept: PRIMARY CARE | Facility: CLINIC | Age: 40
End: 2025-02-06
Payer: COMMERCIAL

## 2025-02-06 VITALS
SYSTOLIC BLOOD PRESSURE: 112 MMHG | BODY MASS INDEX: 19.79 KG/M2 | WEIGHT: 134 LBS | OXYGEN SATURATION: 100 % | HEART RATE: 77 BPM | TEMPERATURE: 97.3 F | DIASTOLIC BLOOD PRESSURE: 68 MMHG

## 2025-02-06 DIAGNOSIS — Z02.89 MEDICATION MANAGEMENT CONTRACT SIGNED: ICD-10-CM

## 2025-02-06 DIAGNOSIS — F90.0 ATTENTION DEFICIT HYPERACTIVITY DISORDER (ADHD), PREDOMINANTLY INATTENTIVE TYPE: ICD-10-CM

## 2025-02-06 DIAGNOSIS — Z79.899 ENCOUNTER FOR MEDICATION MANAGEMENT: Primary | ICD-10-CM

## 2025-02-06 PROCEDURE — 1036F TOBACCO NON-USER: CPT | Performed by: NURSE PRACTITIONER

## 2025-02-06 PROCEDURE — 99214 OFFICE O/P EST MOD 30 MIN: CPT | Performed by: NURSE PRACTITIONER

## 2025-02-06 RX ORDER — LISDEXAMFETAMINE DIMESYLATE 20 MG/1
20 CAPSULE ORAL EVERY MORNING
Qty: 30 CAPSULE | Refills: 0 | Status: SHIPPED | OUTPATIENT
Start: 2025-03-29 | End: 2025-04-28

## 2025-02-06 RX ORDER — LISDEXAMFETAMINE DIMESYLATE 20 MG/1
20 CAPSULE ORAL EVERY MORNING
Qty: 30 CAPSULE | Refills: 0 | Status: SHIPPED | OUTPATIENT
Start: 2025-02-27 | End: 2025-03-29

## 2025-02-06 RX ORDER — LISDEXAMFETAMINE DIMESYLATE 20 MG/1
20 CAPSULE ORAL EVERY MORNING
Qty: 30 CAPSULE | Refills: 0 | Status: SHIPPED | OUTPATIENT
Start: 2025-04-27 | End: 2025-05-27

## 2025-02-06 ASSESSMENT — ENCOUNTER SYMPTOMS: DEPRESSION: 0

## 2025-02-06 NOTE — ASSESSMENT & PLAN NOTE
Signed Feb 6, 2025  Agrees to 3 month follow up  Drug Screen completed  Medication started by Psychiatry, we will manage

## 2025-02-06 NOTE — PATIENT INSTRUCTIONS
You are being prescribed a controlled substance today.  A Controlled Substance Agreement has been discussed with you and agreed upon by you and the provider.   This agreement must be renewed on a yearly basis as long as you are being prescribed this medication.   You must be seen yearly in order to obtain medication refills unless you are prescribed an Opioid or Benzodiazepine in which you must be seen every 90 days in order to obtain medication refills.  You will be required to submit a urine sample up to 4 x a year and at a minimum once annually. This sample must indicate compliance with the Controlled Substance Agreement.   Failure to follow these requirements will result in the termination of your agreement.     Your Controlled Substance Renewal Date is February 6, 2026

## 2025-02-06 NOTE — PROGRESS NOTES
Subjective   Patient ID: Mary Lantigua is a 39 y.o. female who presents for Follow-up (1 month).    HPI   Pleasant established patient who is here for follow up on ADHD/ADD medication.  Evaluated by Psychiatry and started treatment for ADHD inattentive type, Vyvanse 20 mg q d  Reports started 6 days ago, feels more clear with thoughts, falls asleep easily, endorses waking in the night but states this is her normal.  Denies chest pain, palpitation, sob, f/c/n/v.  Weight is stable, following healthy diet.  Reports medication  compliance.  CSA reviewed and signed today  Next CSA due Feb 6, 2026  Discussed compliance, testing requirements, follow up every 3 months, agrees to POC, questions answered.    Plan to continue as prescribed, followup as scheduled  IVF planned for the Fall, will stop medications at this time    Review of Systems  Review of Systems   Constitutional: Negative.    Respiratory: Negative.     Cardiovascular: Negative.    Gastrointestinal: Negative.    All other systems reviewed and are negative.    Objective   /68 (BP Location: Left arm, Patient Position: Sitting)   Pulse 77   Temp 36.3 °C (97.3 °F)   Wt 60.8 kg (134 lb)   LMP 01/20/2025   SpO2 100%   BMI 19.79 kg/m²     Physical Exam  Physical Exam  Vitals reviewed.   Constitutional:       General: She is active.   HENT:      Head: Normocephalic and atraumatic.   Cardiovascular:      Rate and Rhythm: Normal rate and regular rhythm.   Pulmonary:      Effort: Pulmonary effort is normal.      Breath sounds: Normal breath sounds.   Musculoskeletal:         General: Normal range of motion.      Cervical back: Neck supple.   Neurological:      General: No focal deficit present.      Mental Status: She is alert.     Assessment/Plan   Problem List Items Addressed This Visit             ICD-10-CM    Attention deficit disorder F98.8    Relevant Medications    lisdexamfetamine (Vyvanse) 20 mg capsule (Start on 2/27/2025)    lisdexamfetamine  (Vyvanse) 20 mg capsule (Start on 3/29/2025)    lisdexamfetamine (Vyvanse) 20 mg capsule (Start on 4/27/2025)    Medication management contract signed Z02.89     Signed Feb 6, 2025  Agrees to 3 month follow up  Drug Screen completed  Medication started by Psychiatry, we will manage         Encounter for medication management - Primary Z79.899

## 2025-05-08 ENCOUNTER — APPOINTMENT (OUTPATIENT)
Dept: PRIMARY CARE | Facility: CLINIC | Age: 40
End: 2025-05-08
Payer: COMMERCIAL

## 2025-05-08 VITALS
WEIGHT: 127.8 LBS | BODY MASS INDEX: 18.87 KG/M2 | HEART RATE: 71 BPM | TEMPERATURE: 96.8 F | DIASTOLIC BLOOD PRESSURE: 82 MMHG | SYSTOLIC BLOOD PRESSURE: 128 MMHG | OXYGEN SATURATION: 98 %

## 2025-05-08 DIAGNOSIS — Z02.89 MEDICATION MANAGEMENT CONTRACT SIGNED: ICD-10-CM

## 2025-05-08 DIAGNOSIS — Z79.899 ENCOUNTER FOR MEDICATION MANAGEMENT: Primary | ICD-10-CM

## 2025-05-08 DIAGNOSIS — F90.0 ATTENTION DEFICIT HYPERACTIVITY DISORDER (ADHD), PREDOMINANTLY INATTENTIVE TYPE: ICD-10-CM

## 2025-05-08 DIAGNOSIS — Z31.83 ENCOUNTER FOR IN VITRO FERTILIZATION: ICD-10-CM

## 2025-05-08 PROCEDURE — 1036F TOBACCO NON-USER: CPT | Performed by: NURSE PRACTITIONER

## 2025-05-08 PROCEDURE — 99214 OFFICE O/P EST MOD 30 MIN: CPT | Performed by: NURSE PRACTITIONER

## 2025-05-08 RX ORDER — LISDEXAMFETAMINE DIMESYLATE 20 MG/1
20 CAPSULE ORAL EVERY MORNING
Qty: 30 CAPSULE | Refills: 0 | Status: CANCELLED | OUTPATIENT
Start: 2025-05-08 | End: 2025-06-07

## 2025-05-08 ASSESSMENT — PATIENT HEALTH QUESTIONNAIRE - PHQ9
1. LITTLE INTEREST OR PLEASURE IN DOING THINGS: NOT AT ALL
SUM OF ALL RESPONSES TO PHQ9 QUESTIONS 1 AND 2: 0
2. FEELING DOWN, DEPRESSED OR HOPELESS: NOT AT ALL

## 2025-05-08 ASSESSMENT — ENCOUNTER SYMPTOMS: DEPRESSION: 0

## 2025-05-08 ASSESSMENT — PAIN SCALES - GENERAL: PAINLEVEL_OUTOF10: 0-NO PAIN

## 2025-05-08 NOTE — PROGRESS NOTES
Subjective   Patient ID: Mary Lantigua is a 40 y.o. female who presents for Follow-up.    HPI   Established patient who is here for follow up on ADHD/ADD medication.  Feels med is working well, clear thoughts, better at work and home with tasks.  Weight is stable, following healthy diet.  Denies Insomnia, no chest pain or palpitation.   Reports medication and agreement compliance.  Plan to continue as prescribed, followup as scheduled    Labs requested for IVF online, will draw today    Review of Systems  Review of Systems   Constitutional: Negative.    HENT: Negative.     Respiratory: Negative.     Cardiovascular: Negative.    Gastrointestinal: Negative.    All other systems reviewed and are negative.    Objective   /82 (BP Location: Left arm, Patient Position: Sitting)   Pulse 71   Temp 36 °C (96.8 °F) (Temporal)   Wt 58 kg (127 lb 12.8 oz)   LMP 04/19/2025   SpO2 98%   BMI 18.87 kg/m²     Physical Exam  Physical Exam  Vitals reviewed.   Constitutional:       General: She is active.   Cardiovascular:      Rate and Rhythm: Normal rate and regular rhythm.   Pulmonary:      Effort: Pulmonary effort is normal.      Breath sounds: Normal breath sounds.   Musculoskeletal:         General: Normal range of motion.      Cervical back: Neck supple.   Skin:     General: Skin is warm and dry.      Capillary Refill: Capillary refill takes less than 2 seconds.   Neurological:      General: No focal deficit present.      Mental Status: She is alert.     Assessment/Plan   Problem List Items Addressed This Visit           ICD-10-CM    Attention deficit disorder F98.8    Follow up 3 months  discontinue when starting IVF         Relevant Orders    Amphetamine Confirm, Urine    Medication management contract signed Z02.89    Encounter for medication management - Primary Z79.899    Encounter for in vitro fertilization Z31.83    Relevant Orders    Direct antiglobulin test    CBC    Prolactin level    Tsh With Reflex To  Free T4 If Abnormal

## 2025-05-09 ENCOUNTER — LAB (OUTPATIENT)
Dept: LAB | Facility: HOSPITAL | Age: 40
End: 2025-05-09
Payer: COMMERCIAL

## 2025-05-09 LAB
ERYTHROCYTE [DISTWIDTH] IN BLOOD BY AUTOMATED COUNT: 11.9 % (ref 11–15)
HCT VFR BLD AUTO: 43.8 % (ref 35–45)
HGB BLD-MCNC: 14.9 G/DL (ref 11.7–15.5)
MCH RBC QN AUTO: 32 PG (ref 27–33)
MCHC RBC AUTO-ENTMCNC: 34 G/DL (ref 32–36)
MCV RBC AUTO: 94.2 FL (ref 80–100)
PLATELET # BLD AUTO: 251 THOUSAND/UL (ref 140–400)
PMV BLD REES-ECKER: 11.2 FL (ref 7.5–12.5)
PROLACTIN SERPL-MCNC: 3.1 NG/ML
RBC # BLD AUTO: 4.65 MILLION/UL (ref 3.8–5.1)
TSH SERPL-ACNC: 1.26 MIU/L
WBC # BLD AUTO: 6.8 THOUSAND/UL (ref 3.8–10.8)

## 2025-05-10 LAB — DAT-POLYSPECIFIC: NORMAL

## 2025-05-13 LAB
AMPHET UR-MCNC: 3579 NG/ML
MDA UR-MCNC: NEGATIVE NG/ML
MDEA UR-MCNC: NEGATIVE NG/ML
MDMA UR-MCNC: NEGATIVE NG/ML
METHAMPHET UR-MCNC: NEGATIVE NG/ML
PHENTERMINE UR-MCNC: NEGATIVE NG/ML

## 2025-05-14 DIAGNOSIS — Z02.89 MEDICATION MANAGEMENT CONTRACT SIGNED: ICD-10-CM

## 2025-05-14 DIAGNOSIS — F90.0 ATTENTION DEFICIT HYPERACTIVITY DISORDER (ADHD), PREDOMINANTLY INATTENTIVE TYPE: Primary | ICD-10-CM

## 2025-05-14 DIAGNOSIS — Z79.899 ENCOUNTER FOR MEDICATION MANAGEMENT: ICD-10-CM

## 2025-05-14 RX ORDER — LISDEXAMFETAMINE DIMESYLATE 20 MG/1
20 CAPSULE ORAL EVERY MORNING
Qty: 30 CAPSULE | Refills: 0 | Status: SHIPPED | OUTPATIENT
Start: 2025-06-13 | End: 2025-07-13

## 2025-05-14 RX ORDER — LISDEXAMFETAMINE DIMESYLATE 20 MG/1
20 CAPSULE ORAL EVERY MORNING
Qty: 30 CAPSULE | Refills: 0 | Status: SHIPPED | OUTPATIENT
Start: 2025-05-14 | End: 2025-06-13

## 2025-05-14 RX ORDER — LISDEXAMFETAMINE DIMESYLATE 20 MG/1
20 CAPSULE ORAL EVERY MORNING
Qty: 30 CAPSULE | Refills: 0 | Status: SHIPPED | OUTPATIENT
Start: 2025-07-13 | End: 2025-08-12

## 2025-05-16 ENCOUNTER — LAB REQUISITION (OUTPATIENT)
Dept: LAB | Facility: HOSPITAL | Age: 40
End: 2025-05-16
Payer: COMMERCIAL

## 2025-05-16 DIAGNOSIS — Z00.00 ENCOUNTER FOR GENERAL ADULT MEDICAL EXAMINATION WITHOUT ABNORMAL FINDINGS: ICD-10-CM

## 2025-05-29 ENCOUNTER — LAB REQUISITION (OUTPATIENT)
Dept: LAB | Facility: HOSPITAL | Age: 40
End: 2025-05-29
Payer: COMMERCIAL

## 2025-05-29 DIAGNOSIS — Z00.00 ENCOUNTER FOR GENERAL ADULT MEDICAL EXAMINATION WITHOUT ABNORMAL FINDINGS: ICD-10-CM

## 2025-05-29 LAB
ABO GROUP (TYPE) IN BLOOD: NORMAL
ANTIBODY SCREEN: NORMAL
RH FACTOR (ANTIGEN D): NORMAL

## 2025-05-29 PROCEDURE — 86880 COOMBS TEST DIRECT: CPT

## 2025-05-29 PROCEDURE — 86901 BLOOD TYPING SEROLOGIC RH(D): CPT

## 2025-05-29 PROCEDURE — 86850 RBC ANTIBODY SCREEN: CPT

## 2025-05-29 PROCEDURE — 86900 BLOOD TYPING SEROLOGIC ABO: CPT

## 2025-05-30 LAB
DAT-POLYSPECIFIC: NORMAL
HOLD SPECIMEN: NORMAL

## 2025-08-11 ENCOUNTER — LAB REQUISITION (OUTPATIENT)
Dept: LAB | Facility: HOSPITAL | Age: 40
End: 2025-08-11
Payer: COMMERCIAL

## 2025-08-11 DIAGNOSIS — N91.2 AMENORRHEA, UNSPECIFIED: ICD-10-CM

## 2025-08-11 LAB
B-HCG SERPL-ACNC: <3 MIU/ML
ESTRADIOL SERPL-MCNC: 31 PG/ML
LH SERPL-ACNC: 3.3 IU/L
PROGEST SERPL-MCNC: <0.3 NG/ML

## 2025-08-11 PROCEDURE — 84144 ASSAY OF PROGESTERONE: CPT

## 2025-08-11 PROCEDURE — 83002 ASSAY OF GONADOTROPIN (LH): CPT

## 2025-08-11 PROCEDURE — 84702 CHORIONIC GONADOTROPIN TEST: CPT

## 2025-08-11 PROCEDURE — 82670 ASSAY OF TOTAL ESTRADIOL: CPT

## 2025-08-12 LAB — HOLD SPECIMEN: NORMAL

## 2025-08-14 PROCEDURE — 82670 ASSAY OF TOTAL ESTRADIOL: CPT

## 2025-08-15 ENCOUNTER — LAB REQUISITION (OUTPATIENT)
Dept: LAB | Facility: HOSPITAL | Age: 40
End: 2025-08-15
Payer: COMMERCIAL

## 2025-08-15 DIAGNOSIS — Z31.83 ENCOUNTER FOR ASSISTED REPRODUCTIVE FERTILITY PROCEDURE CYCLE: ICD-10-CM

## 2025-08-15 LAB — ESTRADIOL SERPL-MCNC: 135 PG/ML

## 2025-08-21 ENCOUNTER — LAB REQUISITION (OUTPATIENT)
Dept: LAB | Facility: HOSPITAL | Age: 40
End: 2025-08-21
Payer: COMMERCIAL

## 2025-08-21 DIAGNOSIS — Z31.83 ENCOUNTER FOR ASSISTED REPRODUCTIVE FERTILITY PROCEDURE CYCLE: ICD-10-CM

## 2025-08-21 LAB
ESTRADIOL SERPL-MCNC: 2008 PG/ML
LH SERPL-ACNC: 6 IU/L
PROGEST SERPL-MCNC: <0.3 NG/ML

## 2025-08-21 PROCEDURE — 82670 ASSAY OF TOTAL ESTRADIOL: CPT

## 2025-08-21 PROCEDURE — 83002 ASSAY OF GONADOTROPIN (LH): CPT

## 2025-08-21 PROCEDURE — 84144 ASSAY OF PROGESTERONE: CPT

## 2025-08-22 LAB — HOLD SPECIMEN: NORMAL

## 2025-08-23 ENCOUNTER — LAB REQUISITION (OUTPATIENT)
Dept: LAB | Facility: HOSPITAL | Age: 40
End: 2025-08-23
Payer: COMMERCIAL

## 2025-08-23 DIAGNOSIS — Z31.83 ENCOUNTER FOR ASSISTED REPRODUCTIVE FERTILITY PROCEDURE CYCLE: ICD-10-CM

## 2025-08-23 LAB — PROGEST SERPL-MCNC: 45.7 NG/ML

## 2025-08-23 PROCEDURE — 84144 ASSAY OF PROGESTERONE: CPT

## 2025-09-05 ENCOUNTER — LAB REQUISITION (OUTPATIENT)
Dept: LAB | Facility: HOSPITAL | Age: 40
End: 2025-09-05
Payer: COMMERCIAL

## 2025-09-05 DIAGNOSIS — Z32.00 ENCOUNTER FOR PREGNANCY TEST, RESULT UNKNOWN: ICD-10-CM

## 2025-09-05 LAB
B-HCG SERPL-ACNC: 188 MIU/ML
ESTRADIOL SERPL-MCNC: 1182 PG/ML
HOLD SPECIMEN: NORMAL
PROGEST SERPL-MCNC: 13.2 NG/ML

## 2025-09-07 ENCOUNTER — LAB REQUISITION (OUTPATIENT)
Dept: LAB | Facility: HOSPITAL | Age: 40
End: 2025-09-07
Payer: COMMERCIAL

## 2025-09-07 DIAGNOSIS — Z13.29 ENCOUNTER FOR SCREENING FOR OTHER SUSPECTED ENDOCRINE DISORDER: ICD-10-CM

## 2025-09-07 DIAGNOSIS — O09.811 SUPERVISION OF PREGNANCY RESULTING FROM ASSISTED REPRODUCTIVE TECHNOLOGY, FIRST TRIMESTER: ICD-10-CM

## 2025-09-07 LAB
B-HCG SERPL-ACNC: 438 MIU/ML
TSH SERPL-ACNC: 0.96 MIU/L (ref 0.44–3.98)